# Patient Record
Sex: FEMALE | Race: WHITE | NOT HISPANIC OR LATINO | Employment: UNEMPLOYED | URBAN - METROPOLITAN AREA
[De-identification: names, ages, dates, MRNs, and addresses within clinical notes are randomized per-mention and may not be internally consistent; named-entity substitution may affect disease eponyms.]

---

## 2018-09-19 ENCOUNTER — OFFICE VISIT (OUTPATIENT)
Dept: NEUROLOGY | Facility: CLINIC | Age: 78
End: 2018-09-19

## 2018-09-19 VITALS
WEIGHT: 143 LBS | DIASTOLIC BLOOD PRESSURE: 82 MMHG | BODY MASS INDEX: 26.31 KG/M2 | SYSTOLIC BLOOD PRESSURE: 123 MMHG | HEIGHT: 62 IN

## 2018-09-19 DIAGNOSIS — G30.1 LATE ONSET ALZHEIMER'S DISEASE WITHOUT BEHAVIORAL DISTURBANCE (HCC): Primary | ICD-10-CM

## 2018-09-19 DIAGNOSIS — F02.80 LATE ONSET ALZHEIMER'S DISEASE WITHOUT BEHAVIORAL DISTURBANCE (HCC): Primary | ICD-10-CM

## 2018-09-19 PROCEDURE — 99204 OFFICE O/P NEW MOD 45 MIN: CPT | Performed by: PSYCHIATRY & NEUROLOGY

## 2018-09-19 RX ORDER — DONEPEZIL HYDROCHLORIDE 10 MG/1
10 TABLET, FILM COATED ORAL NIGHTLY
COMMUNITY
End: 2019-03-25 | Stop reason: SDUPTHER

## 2018-09-19 RX ORDER — ATORVASTATIN CALCIUM 10 MG/1
10 TABLET, FILM COATED ORAL DAILY
COMMUNITY

## 2018-09-19 RX ORDER — LEVOTHYROXINE SODIUM 0.07 MG/1
75 TABLET ORAL DAILY
COMMUNITY

## 2018-09-19 RX ORDER — MEMANTINE HYDROCHLORIDE 10 MG/1
28 TABLET ORAL DAILY
COMMUNITY
End: 2019-03-25

## 2018-09-19 NOTE — PROGRESS NOTES
"Subjective     CC: Memory Loss and Parkinson's Disease    History of Present Illness     Rachel Calvert is a 78 y.o. female who comes to clinic today for evaluation of Alzheimer's Disease . Her family has noted gradually progressive symptoms since approximately 2013 marked by progressive cognitive impairment now affecting all spheres of cognition. There have been no associated BPSD. There are no clear exacerbating or alleviating factors.    She is currently residing at [facility] after moving from [place] in the summer of 2018.    Prior evaluation has included screening blood work  and an MRI of the brain which were unremarkable . She is currently taking donepezil and memantine.    I have reviewed and confirmed the past family, social and medical history as accurate on 9/19/18.    PMH: HLP, AD, PD, Glaucoma  FH: AD  SH: no tobacco, no EtOH     Review of Systems   Constitutional: Negative.    Respiratory: Negative.    Cardiovascular: Negative.    Gastrointestinal: Negative.    Genitourinary: Negative.    Psychiatric/Behavioral: Negative.    All other systems reviewed and are negative.      Objective   General appearance today is normal.   Peripheral pulses were present and symmetric.   The ophthalmoscopic exam today is unremarkable. The discs and posterior elements are unremarkable.    /82   Ht 157.5 cm (62\")   Wt 64.9 kg (143 lb)   BMI 26.16 kg/m²     Physical Exam   Neurological: She has normal strength. She has a normal Finger-Nose-Finger Test. Gait normal.   Reflex Scores:       Tricep reflexes are 1+ on the right side and 1+ on the left side.       Bicep reflexes are 1+ on the right side and 1+ on the left side.       Brachioradialis reflexes are 1+ on the right side and 1+ on the left side.       Patellar reflexes are 1+ on the right side and 1+ on the left side.       Achilles reflexes are 1+ on the right side and 1+ on the left side.  Psychiatric: Her speech is normal.        Neurologic Exam     Mental " Status   Oriented to person.   Disoriented to place.   Disoriented to time.   Registration: recalls 3 of 3 objects. Recall at 5 minutes: recalls 2 of 3 objects. Follows 3 step commands.   Attention: normal.   Speech: speech is normal   Level of consciousness: alert  Knowledge: poor.   Unable to name object. Unable to read. Unable to repeat. Unable to write. Normal comprehension.     Cranial Nerves   Cranial nerves II through XII intact.     Motor Exam   Muscle bulk: normal  Overall muscle tone: normal    Strength   Strength 5/5 throughout.     Sensory Exam   Light touch normal.     Gait, Coordination, and Reflexes     Gait  Gait: normal    Coordination   Finger to nose coordination: normal    Reflexes   Right brachioradialis: 1+  Left brachioradialis: 1+  Right biceps: 1+  Left biceps: 1+  Right triceps: 1+  Left triceps: 1+  Right patellar: 1+  Left patellar: 1+  Right achilles: 1+  Left achilles: 1+      MMSE=9      Assessment/Plan   Rachel was seen today for memory loss and parkinson's disease.    Diagnoses and all orders for this visit:    Late onset Alzheimer's disease without behavioral disturbance          DISCUSSION/SUMMARY    Rachel Calvert comes to clinic today with a history of Alzheimer's Disease. I feel that her prior evaluation and current treatment have been reasonable. I discussed potential associated parkinsonism, though I'm unconvinced of PD, which I discussed, and I did not recommend any additional medication trials at this time. She will then follow up in 6 months , or sooner if needed.     As part of this visit I obtained additional history from the family which is incorporated in the HPI. Please see above for additional details.

## 2018-12-15 ENCOUNTER — HOSPITAL ENCOUNTER (EMERGENCY)
Facility: HOSPITAL | Age: 78
Discharge: NURSING, REHAB OR LONG-TERM CARE | End: 2018-12-15
Attending: EMERGENCY MEDICINE | Admitting: EMERGENCY MEDICINE

## 2018-12-15 ENCOUNTER — APPOINTMENT (OUTPATIENT)
Dept: CT IMAGING | Facility: HOSPITAL | Age: 78
End: 2018-12-15

## 2018-12-15 VITALS
OXYGEN SATURATION: 98 % | RESPIRATION RATE: 14 BRPM | TEMPERATURE: 98 F | DIASTOLIC BLOOD PRESSURE: 74 MMHG | SYSTOLIC BLOOD PRESSURE: 145 MMHG | BODY MASS INDEX: 26.68 KG/M2 | HEART RATE: 59 BPM | WEIGHT: 145 LBS | HEIGHT: 62 IN
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
## 2018-12-15 DIAGNOSIS — S01.01XA LACERATION OF SCALP, INITIAL ENCOUNTER: ICD-10-CM

## 2018-12-15 DIAGNOSIS — S00.83XA CONTUSION OF FACE, INITIAL ENCOUNTER: ICD-10-CM

## 2018-12-15 DIAGNOSIS — S09.90XA MINOR CLOSED HEAD INJURY: ICD-10-CM

## 2018-12-15 PROCEDURE — 70450 CT HEAD/BRAIN W/O DYE: CPT

## 2018-12-15 PROCEDURE — 99284 EMERGENCY DEPT VISIT MOD MDM: CPT

## 2018-12-15 RX ORDER — ACETAMINOPHEN 325 MG/1
650 TABLET ORAL ONCE
Status: COMPLETED | OUTPATIENT
Start: 2018-12-15 | End: 2018-12-15

## 2018-12-15 RX ADMIN — ACETAMINOPHEN 650 MG: 325 TABLET ORAL at 10:01

## 2018-12-15 RX ADMIN — LIDOCAINE HYDROCHLORIDE 10 ML: 10; .005 INJECTION, SOLUTION EPIDURAL; INFILTRATION; INTRACAUDAL; PERINEURAL at 10:13

## 2018-12-15 NOTE — ED PROVIDER NOTES
Subjective   Rachel Calvert is a 78 y.o.female who presents to the emergency department via EMS after suffering a fall. The fall was unwitnessed but caregivers at [facility] unit state that she was walking to the bathroom and she tripped over the nightstand. Her point of contact during the fall was the head. Mrs. Calvert has dementia but caregivers state she is not altered above baseline. She denies any headaches and states that she is not in pain. She denies any use of blood thinners and denies any recent illness. She has suffered falls in the past but none similar to this. There are no other acute complaints at this time.        History provided by:  Relative and caregiver  History limited by:  Dementia  Fall   Mechanism of injury: fall    Injury location:  Head/neck and face  Head/neck injury location:  Head and scalp  Incident location:  Nursing home  Arrived directly from scene: yes    Fall:     Fall occurred:  Walking and tripped    Impact surface:  Hard floor    Point of impact:  Head and face  Suspicion of alcohol use: no    Suspicion of drug use: no    Associated symptoms: no headaches    Risk factors: no anticoagulation therapy        Review of Systems   Unable to perform ROS: Dementia   Neurological: Negative for headaches.       Past Medical History:   Diagnosis Date   • Dementia    • Disease of thyroid gland    • Hyperlipidemia        Allergies   Allergen Reactions   • Ciprofloxacin Hives   • Codeine Nausea Only   • Penicillins Hives   • Sulfadiazine Nausea Only       Past Surgical History:   Procedure Laterality Date   •  SECTION     • FACIAL COSMETIC SURGERY     • HYSTERECTOMY     • SKIN CANCER EXCISION         History reviewed. No pertinent family history.    Social History     Socioeconomic History   • Marital status:      Spouse name: Not on file   • Number of children: Not on file   • Years of education: Not on file   • Highest education level: Not on file   Tobacco Use   • Smoking  status: Never Smoker   Substance and Sexual Activity   • Alcohol use: No     Frequency: Never   • Drug use: No   • Sexual activity: Defer         Objective   Physical Exam   Constitutional: She appears well-developed and well-nourished. No distress.   HENT:   Patient has a 2cm laceration just above the left ear on the scalp. Swelling and an abrasion on her forehead. Swelling on the upper nose.    Eyes: Conjunctivae are normal. No scleral icterus.   Neck: Normal range of motion. Neck supple.   Cardiovascular: Normal rate, regular rhythm and normal heart sounds.   Pulmonary/Chest: Effort normal and breath sounds normal. No respiratory distress.   Abdominal: Soft. There is no tenderness.   Musculoskeletal: Normal range of motion.   Neurological: She is alert.   Patient is not oriented but is awake and alert.    Skin: Skin is warm and dry.   Psychiatric: She has a normal mood and affect. Her behavior is normal.   Nursing note and vitals reviewed.      Laceration Repair  Date/Time: 12/15/2018 11:18 AM  Performed by: Franco Bolden MD  Authorized by: Franco Bolden MD     Consent:     Consent obtained:  Verbal    Consent given by:  Spouse  Anesthesia (see MAR for exact dosages):     Anesthesia method:  Local infiltration    Local anesthetic:  Lidocaine 1% WITH epi  Laceration details:     Location:  Scalp    Scalp location:  Occipital    Length (cm):  4  Repair type:     Repair type:  Simple  Pre-procedure details:     Preparation:  Patient was prepped and draped in usual sterile fashion  Exploration:     Hemostasis achieved with:  Epinephrine  Treatment:     Area cleansed with:  Betadine (mild betadine solution)    Amount of cleaning:  Standard    Visualized foreign bodies/material removed: no    Skin repair:     Repair method:  Staples    Number of staples:  7  Approximation:     Approximation:  Close  Post-procedure details:     Patient tolerance of procedure:  Tolerated well, no immediate complications              ED Course  ED Course as of Dec 15 1514   Sat Dec 15, 2018   1116 On repeat exam Mrs. aguirre remains awake alert and at baseline neurologic status.  I infiltrated lidocaine with epinephrine and further scrubbed her laceration.  On further inspection now the laceration is actually 4 cm and L shaped.  I washed under the flap and closed with 7 staples I gave discharge instructions to her  and will send her back to her nursing home with a staple remover  [DT]      ED Course User Index  [DT] Franco Bolden MD                     MDM  Number of Diagnoses or Management Options  Contusion of face, initial encounter: new and requires workup  Fall at nursing home, initial encounter:   Laceration of scalp, initial encounter: new and requires workup  Minor closed head injury: new and requires workup     Amount and/or Complexity of Data Reviewed  Tests in the radiology section of CPT®: ordered and reviewed  Obtain history from someone other than the patient: yes    Patient Progress  Patient progress: improved      Final diagnoses:   Fall at nursing home, initial encounter   Laceration of scalp, initial encounter   Contusion of face, initial encounter   Minor closed head injury       Documentation assistance provided by edna Ornelas.  Information recorded by the edna was done at my direction and has been verified and validated by me.     Nimesh Ornelas  12/15/18 0951       Nimesh Ornelas  12/15/18 1121       Franco Bolden MD  12/15/18 [id number]

## 2018-12-15 NOTE — DISCHARGE INSTRUCTIONS
Follow-up with your primary care provider at your nursing home.  Return if headache, change in mental status, or any other concerns.  You may begin washing this with antibacterial soap and water in 24 hours.  Return if any concerns regarding infection or the appearance of the wound.  Do not use a comb or rigid hairbrush as it may pull staples out.  Remove the staples in 1 week.

## 2018-12-22 ENCOUNTER — HOSPITAL ENCOUNTER (EMERGENCY)
Facility: HOSPITAL | Age: 78
Discharge: HOME | End: 2018-12-22
Attending: EMERGENCY MEDICINE

## 2018-12-22 VITALS
HEIGHT: 65 IN | HEART RATE: 63 BPM | SYSTOLIC BLOOD PRESSURE: 157 MMHG | DIASTOLIC BLOOD PRESSURE: 68 MMHG | BODY MASS INDEX: 27.99 KG/M2 | OXYGEN SATURATION: 96 % | WEIGHT: 168 LBS | RESPIRATION RATE: 18 BRPM | TEMPERATURE: 97.8 F

## 2018-12-22 PROCEDURE — 99201: CPT

## 2019-03-25 ENCOUNTER — OFFICE VISIT (OUTPATIENT)
Dept: NEUROLOGY | Facility: CLINIC | Age: 79
End: 2019-03-25

## 2019-03-25 VITALS — WEIGHT: 163 LBS | HEIGHT: 65 IN | BODY MASS INDEX: 27.16 KG/M2 | RESPIRATION RATE: 13 BRPM

## 2019-03-25 DIAGNOSIS — F02.80 LATE ONSET ALZHEIMER'S DISEASE WITHOUT BEHAVIORAL DISTURBANCE (HCC): Primary | ICD-10-CM

## 2019-03-25 DIAGNOSIS — G30.1 LATE ONSET ALZHEIMER'S DISEASE WITHOUT BEHAVIORAL DISTURBANCE (HCC): Primary | ICD-10-CM

## 2019-03-25 PROCEDURE — 99213 OFFICE O/P EST LOW 20 MIN: CPT | Performed by: PSYCHIATRY & NEUROLOGY

## 2019-03-25 RX ORDER — MEMANTINE HYDROCHLORIDE 28 MG/1
28 CAPSULE, EXTENDED RELEASE ORAL DAILY
Qty: 90 CAPSULE | Refills: 3 | Status: SHIPPED | OUTPATIENT
Start: 2019-03-25

## 2019-03-25 RX ORDER — DONEPEZIL HYDROCHLORIDE 10 MG/1
10 TABLET, FILM COATED ORAL NIGHTLY
Qty: 90 TABLET | Refills: 3 | Status: SHIPPED | OUTPATIENT
Start: 2019-03-25

## 2019-03-25 NOTE — PROGRESS NOTES
"Rachel Calvert    Subjective     CC: memory impairment    History of Present Illness   Rachel Calvert returns to clinic today with a history of Alzheimer's Disease . She has had gradually progressive symptoms since approximately 2013 matked by impairments in all spheres of cognition.    Prior evaluation has included screening blood work  and an MRI of the brain which were unremarkable .     Since her last visit on 9/19/18, she continues to decline cognitively with increasing impairments in all spheres of cognition. She remains at [facility] and follows with Dr. Decker. She has also been noted to have a shuffling gait previously. She is not having significant behavioral symptoms.    I have reviewed and confirmed the past family, social and medical history as accurate on 3/25/19.     Review of Systems   Unable to perform ROS: Dementia       Objective     Resp 13   Ht 165.1 cm (65\")   Wt 73.9 kg (163 lb)   BMI 27.12 kg/m²      Neurologic Exam     Mental Status   Oriented to person.   Disoriented to place.   Disoriented to time.   Follows 1 step commands.   Attention: normal.   Speech: speech is normal   Level of consciousness: alert  Abnormal comprehension.     Cranial Nerves   Cranial nerves II through XII intact.     Motor Exam   Muscle bulk: normal  Overall muscle tone: normal    Strength   Strength 5/5 throughout.     Gait, Coordination, and Reflexes     Gait  Gait: (apractic)      MMSE=untestable      Assessment/Plan   Rachel was seen today for memory loss.    Diagnoses and all orders for this visit:    Late onset Alzheimer's disease without behavioral disturbance    Other orders  -     memantine (NAMENDA XR) 28 MG capsule sustained-release 24 hr extended release capsule; Take 1 capsule by mouth Daily.  -     donepezil (ARICEPT) 10 MG tablet; Take 1 tablet by mouth Every Night.          Rachel Calvert returns to clinic today with a history of Alzheimer's Disease . I again reviewed her current status and treatment options. After " discussing potential treatment options, it was elected to continue on  donepezil and Namenda XR unchanged. I don't see significant parkinsonism at this time, though her gait is apractic, which I did discuss. She will then follow up in 6 months , or sooner if needed.       I spent 20 minutes face to face with the patient and family. I   spent 12 minutes of this time counseling and discussing current status, treatment options and management.    As part of this visit I obtained additional history from the family which is incorporated in the HPI.    Kris Maria MD

## 2019-03-29 ENCOUNTER — TELEPHONE (OUTPATIENT)
Dept: NEUROLOGY | Facility: CLINIC | Age: 79
End: 2019-03-29

## 2019-03-29 NOTE — TELEPHONE ENCOUNTER
Mr. Calvert called to ask about caregiving support groups or counselors to help him with emotional support. Mrs. Calvert is still living at [place] at [facility] and doing well although she's advanced in dementia. I emailed him the [employer] caregiver hotline to call to get a current list of support groups as well as gave him name of Marcella Cali, PhD with [facility] for a trained professional to discuss coping skills. He verbalized understanding to call me and let me know his progress with contacting/attending these services or if I need to identify other resources. He feels these services will be helpful and agreed to call me back.